# Patient Record
Sex: MALE | HISPANIC OR LATINO | Employment: FULL TIME | URBAN - METROPOLITAN AREA
[De-identification: names, ages, dates, MRNs, and addresses within clinical notes are randomized per-mention and may not be internally consistent; named-entity substitution may affect disease eponyms.]

---

## 2018-08-19 ENCOUNTER — HOSPITAL ENCOUNTER (EMERGENCY)
Facility: HOSPITAL | Age: 48
Discharge: HOME | End: 2018-08-19
Attending: EMERGENCY MEDICINE
Payer: COMMERCIAL

## 2018-08-19 VITALS
HEIGHT: 68 IN | HEART RATE: 70 BPM | SYSTOLIC BLOOD PRESSURE: 134 MMHG | RESPIRATION RATE: 18 BRPM | TEMPERATURE: 98 F | BODY MASS INDEX: 27.28 KG/M2 | OXYGEN SATURATION: 97 % | DIASTOLIC BLOOD PRESSURE: 83 MMHG | WEIGHT: 180 LBS

## 2018-08-19 DIAGNOSIS — S92.911A CLOSED NONDISPLACED FRACTURE OF PHALANX OF TOE OF RIGHT FOOT, UNSPECIFIED TOE, INITIAL ENCOUNTER: Primary | ICD-10-CM

## 2018-08-19 PROCEDURE — 99283 EMERGENCY DEPT VISIT LOW MDM: CPT

## 2018-08-19 PROCEDURE — 99283 EMERGENCY DEPT VISIT LOW MDM: CPT | Mod: ,,, | Performed by: EMERGENCY MEDICINE

## 2018-08-19 RX ORDER — IBUPROFEN 200 MG
800 TABLET ORAL EVERY 6 HOURS PRN
COMMUNITY

## 2018-08-19 NOTE — ED TRIAGE NOTES
[place] speaking patient presents to ER with complaint of pain and swelling to his right pinky toe from a steel desk dropping on it 18 days ago.  Son present who speaks fluent English is to interpret and patient's request.  Patient's name and date of birth checked and is correct.  LOC: The patient is awake, alert and aware of environment with an appropriate affect, the patient is oriented x 3 and speaking appropriately.  APPEARANCE: Patient resting comfortably and in no acute distress, patient is clean and well groomed, patient's clothing is properly fastened.  CARDIOVASCULAR:  Heart rate regular and even with no peripheral edema noted.  SKIN: The skin is warm and dry, patient has normal skin turgor and moist mucus membranes, skin intact, no breakdown or brusing noted. MUSKULOSKELETAL: Patient moving all extremities well,  obvious swelling left pinky toe without deformities noted.  RESPIRATORY: Airway is open and patent, respirations are spontaneous, patient has a normal effort and rate.

## 2018-08-19 NOTE — ED PROVIDER NOTES
Encounter Date: 8/19/2018    SCRIBE #1 NOTE: I, Mauricio Jain, am scribing for, and in the presence of,  Dr. Bland. I have scribed the following portions of the note - Other sections scribed: HPI, ROS, PE.       History     Chief Complaint   Patient presents with    Toe Injury     stool fell on toe >15d ago     Time seen by provider: 9:28 AM    This is a 47 y.o. male who presents to the ED with complaint of pain along the lateral aspect of his right foot and ankle after a heavy steel stool fell on his foot 18 days ago. The pain is worst at the right fifth toe. Patient reports no significant improvement of his pain from initial onset. He states that the pain worsens when he walks and bears weight on his right foot.       The history is provided by the patient.     Review of patient's allergies indicates:  No Known Allergies  History reviewed. No pertinent past medical history.  History reviewed. No pertinent surgical history.  Family History   Problem Relation Age of Onset    No Known Problems Mother      Social History     Tobacco Use    Smoking status: Never Smoker    Smokeless tobacco: Never Used   Substance Use Topics    Alcohol use: No     Frequency: Never    Drug use: No     Review of Systems   Musculoskeletal: Positive for arthralgias. Negative for gait problem.   Neurological: Negative for weakness and numbness.       Physical Exam     Initial Vitals [08/19/18 0911]   BP Pulse Resp Temp SpO2   134/83 70 18 97.8 °F (36.6 °C) 97 %      MAP       --         Physical Exam    Nursing note and vitals reviewed.  Constitutional:   Comfortable, well-appearing, no distress.    Cardiovascular:   Pulses:       Dorsalis pedis pulses are 2+ on the right side, and 2+ on the left side.   Musculoskeletal:   Tenderness over the right lower leg, ankle, dorsal foot, base of 5th metatarsal. Mild discoloration over the distal and mid phalanx of the right 5th toe. No abrasion or laceration. Otherwise no swelling of the foot.             ED Course   Procedures  Labs Reviewed - No data to display       Imaging Results    None          Medical Decision Making:   History:   Old Medical Records: I decided to obtain old medical records.  Initial Assessment:   46 yo m, healthy, s/p blunt trauma to R 5th toe 18 days ago, here for persistent pain.  On exam, mild swelling/discoloration/eccymoses of distal 5th digit.  Foot/ankle o/w normal  Differential Diagnosis:   Contusion vs fracture of distal phalanx of 5th digit  ED Management:  Discussed with pt at length that management of fx vs contusion same - adolfo taping toe so no need for imaging  Pt's son translating - explained that we can use an independent  but pt declining  Adolfo taping done in the ED and note for work given            Scribe Attestation:   Scribe #1: I performed the above scribed service and the documentation accurately describes the services I performed. I attest to the accuracy of the note.               Clinical Impression:   The encounter diagnosis was Closed nondisplaced fracture of phalanx of toe of right foot, unspecified toe, initial encounter.         I, Dr. Ela Bland, personally performed the services described in this documentation. All medical record entries made by the scribe were at my direction and in my presence.  I have reviewed the chart and agree that the record reflects my personal performance and is accurate and complete. Ela Bland MD.  9:41 AM 08/23/2018                        Ela Bland MD  08/23/18 0822

## 2024-11-26 ENCOUNTER — TELEPHONE (OUTPATIENT)
Dept: ENDOSCOPY | Facility: HOSPITAL | Age: 54
End: 2024-11-26
Payer: COMMERCIAL

## 2024-11-26 RX ORDER — SODIUM, POTASSIUM,MAG SULFATES 17.5-3.13G
1 SOLUTION, RECONSTITUTED, ORAL ORAL DAILY
Qty: 1 KIT | Refills: 0 | Status: CANCELLED | OUTPATIENT
Start: 2024-11-26 | End: 2024-11-28

## 2024-11-26 RX ORDER — POLYETHYLENE GLYCOL 3350, SODIUM SULFATE ANHYDROUS, SODIUM BICARBONATE, SODIUM CHLORIDE, POTASSIUM CHLORIDE 236; 22.74; 6.74; 5.86; 2.97 G/4L; G/4L; G/4L; G/4L; G/4L
4 POWDER, FOR SOLUTION ORAL ONCE
Status: CANCELLED | OUTPATIENT
Start: 2024-11-26 | End: 2024-11-26

## 2024-11-26 NOTE — TELEPHONE ENCOUNTER
----- Message from Kamilah sent at 11/26/2024 12:49 PM CST -----  Regarding: FW: GI/Colonoscopy Referral    ----- Message -----  From: Celia Kelsey  Sent: 11/26/2024  12:32 PM CST  To: [facility] Endoscopy Schedulers  Subject: GI/Colonoscopy Referral                          ..Good afternoon,     Current patient is being referred for a Colonoscopy. Please call patient to schedule.     Thank you,    [facility]   Fax: [phone]

## 2024-11-26 NOTE — TELEPHONE ENCOUNTER
Contacted patient to schedule a colonoscopy. After asking all questions patient stated he was too busy right now and hung up. Patient will not be scheduled due to not having all information to complete scheduling.